# Patient Record
Sex: FEMALE | Race: BLACK OR AFRICAN AMERICAN | NOT HISPANIC OR LATINO | URBAN - METROPOLITAN AREA
[De-identification: names, ages, dates, MRNs, and addresses within clinical notes are randomized per-mention and may not be internally consistent; named-entity substitution may affect disease eponyms.]

---

## 2021-05-11 ENCOUNTER — OFFICE VISIT (OUTPATIENT)
Dept: URBAN - METROPOLITAN AREA CLINIC 92 | Facility: CLINIC | Age: 76
End: 2021-05-11
Payer: COMMERCIAL

## 2021-05-11 DIAGNOSIS — Z86.010 PERSONAL HISTORY OF COLONIC POLYPS: ICD-10-CM

## 2021-05-11 PROCEDURE — 99202 OFFICE O/P NEW SF 15 MIN: CPT | Performed by: INTERNAL MEDICINE

## 2021-05-11 RX ORDER — KETOTIFEN FUMARATE 0.35 MG/ML
SOLUTION OPHTHALMIC
Qty: 0 | Refills: 0 | Status: ON HOLD | COMMUNITY
Start: 1900-01-01

## 2021-05-11 RX ORDER — LEVOBUNOLOL HYDROCHLORIDE 5 MG/ML
INSTILL 1 DROP INTO AFFECTED EYE(S) BY OPHTHALMIC ROUTE ONCE DAILY SOLUTION/ DROPS OPHTHALMIC 1
Qty: 1 | Refills: 0 | Status: ACTIVE | COMMUNITY
Start: 1900-01-01

## 2021-05-11 RX ORDER — LATANOPROST 0.005 %
INSTILL 1 DROP INTO AFFECTED EYE(S) BY OPHTHALMIC ROUTE ONCE DAILY IN THE EVENING DROPS OPHTHALMIC (EYE) 1
Qty: 1 | Refills: 0 | Status: ACTIVE | COMMUNITY
Start: 1900-01-01

## 2021-05-11 RX ORDER — TAMOXIFEN CITRATE 20 MG/1
TAKE 1 TABLET (20 MG) BY ORAL ROUTE ONCE DAILY TABLET, FILM COATED ORAL 1
Qty: 0 | Refills: 0 | Status: ON HOLD | COMMUNITY
Start: 1900-01-01

## 2021-05-11 RX ORDER — LISINOPRIL 10 MG/1
TABLET ORAL
Qty: 0 | Refills: 0 | Status: ON HOLD | COMMUNITY
Start: 1900-01-01

## 2021-05-11 NOTE — HPI-TODAY'S VISIT:
Pt seen for surveillance colonoscopy. 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 Last colonoscopy with Dr. Baum in 2016 normal. Repeat rec'd in 5 yrs. Denies diarrhea, constipation, wt loss or GI bleeding.

## 2021-05-26 PROBLEM — 428283002: Status: ACTIVE | Noted: 2021-05-11

## 2021-06-25 ENCOUNTER — OFFICE VISIT (OUTPATIENT)
Dept: URBAN - METROPOLITAN AREA SURGERY CENTER 16 | Facility: SURGERY CENTER | Age: 76
End: 2021-06-25
Payer: COMMERCIAL

## 2021-06-25 DIAGNOSIS — Z86.010 H/O ADENOMATOUS POLYP OF COLON: ICD-10-CM

## 2021-06-25 PROCEDURE — G0105 COLORECTAL SCRN; HI RISK IND: HCPCS | Performed by: INTERNAL MEDICINE

## 2021-06-25 PROCEDURE — G8907 PT DOC NO EVENTS ON DISCHARG: HCPCS | Performed by: INTERNAL MEDICINE

## 2024-04-17 ENCOUNTER — OV EP (OUTPATIENT)
Dept: URBAN - METROPOLITAN AREA CLINIC 105 | Facility: CLINIC | Age: 79
End: 2024-04-17

## 2024-05-20 ENCOUNTER — DASHBOARD ENCOUNTERS (OUTPATIENT)
Age: 79
End: 2024-05-20

## 2024-05-20 ENCOUNTER — LAB OUTSIDE AN ENCOUNTER (OUTPATIENT)
Dept: URBAN - METROPOLITAN AREA CLINIC 17 | Facility: CLINIC | Age: 79
End: 2024-05-20

## 2024-05-20 ENCOUNTER — OFFICE VISIT (OUTPATIENT)
Dept: URBAN - METROPOLITAN AREA CLINIC 17 | Facility: CLINIC | Age: 79
End: 2024-05-20
Payer: MEDICARE

## 2024-05-20 VITALS
HEART RATE: 67 BPM | DIASTOLIC BLOOD PRESSURE: 70 MMHG | TEMPERATURE: 98 F | SYSTOLIC BLOOD PRESSURE: 156 MMHG | WEIGHT: 160.4 LBS | HEIGHT: 67 IN | BODY MASS INDEX: 25.18 KG/M2

## 2024-05-20 DIAGNOSIS — Z86.010 PERSONAL HISTORY OF COLONIC POLYPS: ICD-10-CM

## 2024-05-20 DIAGNOSIS — R74.8 ELEVATED LIVER ENZYMES: ICD-10-CM

## 2024-05-20 DIAGNOSIS — R49.0 HOARSENESS: ICD-10-CM

## 2024-05-20 DIAGNOSIS — E80.6 INDIRECT HYPERBILIRUBINEMIA: ICD-10-CM

## 2024-05-20 DIAGNOSIS — K59.09 CHRONIC CONSTIPATION: ICD-10-CM

## 2024-05-20 PROBLEM — 7752002: Status: ACTIVE | Noted: 2024-05-20

## 2024-05-20 PROCEDURE — 99204 OFFICE O/P NEW MOD 45 MIN: CPT | Performed by: INTERNAL MEDICINE

## 2024-05-20 RX ORDER — ATORVASTATIN CALCIUM 40 MG/1
TABLET ORAL
Qty: 90 EACH | Refills: 0 | Status: ACTIVE | COMMUNITY

## 2024-05-20 RX ORDER — KETOTIFEN FUMARATE 0.35 MG/ML
SOLUTION OPHTHALMIC
Qty: 0 | Refills: 0 | Status: ON HOLD | COMMUNITY
Start: 1900-01-01

## 2024-05-20 RX ORDER — NICOTINE 14MG/24HR
AS DIRECTED PATCH, TRANSDERMAL 24 HOURS TRANSDERMAL
Status: ACTIVE | COMMUNITY

## 2024-05-20 RX ORDER — BRIMONIDINE TARTRATE 1 MG/ML
1 DROP INTO AFFECTED EYE SOLUTION/ DROPS OPHTHALMIC
Status: ACTIVE | COMMUNITY

## 2024-05-20 RX ORDER — AMLODIPINE BESYLATE 10 MG/1
TABLET ORAL
Qty: 90 TABLET | Status: ACTIVE | COMMUNITY

## 2024-05-20 RX ORDER — LATANOPROST 50 UG/ML
INSTILL 1 DROP IN BOTH EYES AT BEDTIME SOLUTION OPHTHALMIC
Qty: 10 MILLILITER | Refills: 0 | Status: ACTIVE | COMMUNITY

## 2024-05-20 RX ORDER — TAMOXIFEN CITRATE 20 MG/1
TAKE 1 TABLET (20 MG) BY ORAL ROUTE ONCE DAILY TABLET, FILM COATED ORAL 1
Qty: 0 | Refills: 0 | Status: ON HOLD | COMMUNITY
Start: 1900-01-01

## 2024-05-20 RX ORDER — TIMOLOL MALEATE 5 MG/ML
SOLUTION OPHTHALMIC
Qty: 15 MILLILITER | Status: ACTIVE | COMMUNITY

## 2024-05-20 RX ORDER — METOPROLOL SUCCINATE 25 MG/1
TABLET, FILM COATED, EXTENDED RELEASE ORAL
Qty: 45 EACH | Refills: 1 | Status: ACTIVE | COMMUNITY

## 2024-05-20 RX ORDER — LISINOPRIL 10 MG/1
TABLET ORAL
Qty: 0 | Refills: 0 | Status: ON HOLD | COMMUNITY
Start: 1900-01-01

## 2024-05-20 RX ORDER — VIT A/VIT C/VIT E/ZINC/COPPER 2148-113
AS DIRECTED TABLET ORAL
Status: ACTIVE | COMMUNITY

## 2024-05-21 LAB
ALBUMIN/GLOBULIN RATIO: 1.4
ALBUMIN: 4.9
ALKALINE PHOSPHATASE: 112
ALT (SGPT): 13
AST (SGOT): 15
BILIRUBIN, DIRECT: 0.3
BILIRUBIN, INDIRECT: 1.3
BILIRUBIN, TOTAL: 1.6
GLOBULIN: 3.4
HBSAG SCREEN: (no result)
HEP A AB, IGM: (no result)
HEP B CORE AB, IGM: (no result)
HEPATITIS C ANTIBODY: (no result)
PROTEIN, TOTAL: 8.3

## 2024-06-06 ENCOUNTER — OFFICE VISIT (OUTPATIENT)
Dept: URBAN - METROPOLITAN AREA CLINIC 16 | Facility: CLINIC | Age: 79
End: 2024-06-06
Payer: COMMERCIAL

## 2024-06-06 DIAGNOSIS — K76.89 LESION OF LIVER: ICD-10-CM

## 2024-06-06 DIAGNOSIS — N28.1 RENAL CYST, RIGHT: ICD-10-CM

## 2024-06-06 DIAGNOSIS — R74.8 ABNORMAL LIVER ENZYMES: ICD-10-CM

## 2024-06-06 PROCEDURE — 76705 ECHO EXAM OF ABDOMEN: CPT | Performed by: INTERNAL MEDICINE

## 2024-06-06 RX ORDER — LISINOPRIL 10 MG/1
TABLET ORAL
Qty: 0 | Refills: 0 | Status: ON HOLD | COMMUNITY
Start: 1900-01-01

## 2024-06-06 RX ORDER — AMLODIPINE BESYLATE 10 MG/1
TABLET ORAL
Qty: 90 TABLET | Status: ACTIVE | COMMUNITY

## 2024-06-06 RX ORDER — KETOTIFEN FUMARATE 0.35 MG/ML
SOLUTION OPHTHALMIC
Qty: 0 | Refills: 0 | Status: ON HOLD | COMMUNITY
Start: 1900-01-01

## 2024-06-06 RX ORDER — METOPROLOL SUCCINATE 25 MG/1
TABLET, FILM COATED, EXTENDED RELEASE ORAL
Qty: 45 EACH | Refills: 1 | Status: ACTIVE | COMMUNITY

## 2024-06-06 RX ORDER — TIMOLOL MALEATE 5 MG/ML
SOLUTION OPHTHALMIC
Qty: 15 MILLILITER | Status: ACTIVE | COMMUNITY

## 2024-06-06 RX ORDER — NICOTINE 14MG/24HR
AS DIRECTED PATCH, TRANSDERMAL 24 HOURS TRANSDERMAL
Status: ACTIVE | COMMUNITY

## 2024-06-06 RX ORDER — BRIMONIDINE TARTRATE 1 MG/ML
1 DROP INTO AFFECTED EYE SOLUTION/ DROPS OPHTHALMIC
Status: ACTIVE | COMMUNITY

## 2024-06-06 RX ORDER — TAMOXIFEN CITRATE 20 MG/1
TAKE 1 TABLET (20 MG) BY ORAL ROUTE ONCE DAILY TABLET, FILM COATED ORAL 1
Qty: 0 | Refills: 0 | Status: ON HOLD | COMMUNITY
Start: 1900-01-01

## 2024-06-06 RX ORDER — VIT A/VIT C/VIT E/ZINC/COPPER 2148-113
AS DIRECTED TABLET ORAL
Status: ACTIVE | COMMUNITY

## 2024-06-06 RX ORDER — LATANOPROST 50 UG/ML
INSTILL 1 DROP IN BOTH EYES AT BEDTIME SOLUTION OPHTHALMIC
Qty: 10 MILLILITER | Refills: 0 | Status: ACTIVE | COMMUNITY

## 2024-06-06 RX ORDER — ATORVASTATIN CALCIUM 40 MG/1
TABLET ORAL
Qty: 90 EACH | Refills: 0 | Status: ACTIVE | COMMUNITY

## 2024-06-28 ENCOUNTER — OFFICE VISIT (OUTPATIENT)
Dept: URBAN - METROPOLITAN AREA CLINIC 105 | Facility: CLINIC | Age: 79
End: 2024-06-28
Payer: COMMERCIAL

## 2024-06-28 ENCOUNTER — LAB OUTSIDE AN ENCOUNTER (OUTPATIENT)
Dept: URBAN - METROPOLITAN AREA CLINIC 105 | Facility: CLINIC | Age: 79
End: 2024-06-28

## 2024-06-28 VITALS
HEIGHT: 67 IN | HEART RATE: 63 BPM | TEMPERATURE: 97.2 F | DIASTOLIC BLOOD PRESSURE: 78 MMHG | BODY MASS INDEX: 24.01 KG/M2 | WEIGHT: 153 LBS | SYSTOLIC BLOOD PRESSURE: 179 MMHG

## 2024-06-28 DIAGNOSIS — K59.09 CHRONIC CONSTIPATION: ICD-10-CM

## 2024-06-28 DIAGNOSIS — R49.0 HOARSENESS: ICD-10-CM

## 2024-06-28 DIAGNOSIS — E80.6 INDIRECT HYPERBILIRUBINEMIA: ICD-10-CM

## 2024-06-28 DIAGNOSIS — Z86.010 PERSONAL HISTORY OF COLONIC POLYPS: ICD-10-CM

## 2024-06-28 DIAGNOSIS — K76.9 LIVER LESION: ICD-10-CM

## 2024-06-28 DIAGNOSIS — R74.8 ELEVATED LIVER ENZYMES: ICD-10-CM

## 2024-06-28 PROBLEM — 300331000: Status: ACTIVE | Noted: 2024-06-28

## 2024-06-28 PROCEDURE — 99214 OFFICE O/P EST MOD 30 MIN: CPT | Performed by: INTERNAL MEDICINE

## 2024-06-28 RX ORDER — TIMOLOL MALEATE 5 MG/ML
SOLUTION OPHTHALMIC
Qty: 15 MILLILITER | Status: ACTIVE | COMMUNITY

## 2024-06-28 RX ORDER — METOPROLOL SUCCINATE 25 MG/1
TABLET, FILM COATED, EXTENDED RELEASE ORAL
Qty: 45 EACH | Refills: 1 | Status: ACTIVE | COMMUNITY

## 2024-06-28 RX ORDER — BRIMONIDINE TARTRATE 1 MG/ML
1 DROP INTO AFFECTED EYE SOLUTION/ DROPS OPHTHALMIC
Status: ACTIVE | COMMUNITY

## 2024-06-28 RX ORDER — AMLODIPINE BESYLATE 10 MG/1
TABLET ORAL
Qty: 90 TABLET | Status: ACTIVE | COMMUNITY

## 2024-06-28 RX ORDER — NICOTINE 14MG/24HR
AS DIRECTED PATCH, TRANSDERMAL 24 HOURS TRANSDERMAL
Status: ACTIVE | COMMUNITY

## 2024-06-28 RX ORDER — VIT A/VIT C/VIT E/ZINC/COPPER 2148-113
AS DIRECTED TABLET ORAL
Status: ACTIVE | COMMUNITY

## 2024-06-28 RX ORDER — LATANOPROST 50 UG/ML
INSTILL 1 DROP IN BOTH EYES AT BEDTIME SOLUTION OPHTHALMIC
Qty: 10 MILLILITER | Refills: 0 | Status: ACTIVE | COMMUNITY

## 2024-06-28 NOTE — HPI-TODAY'S VISIT:
Tom Fernández 2021 Pt seen for surveillance colonoscopy. 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 Last colonoscopy with Dr. Baum in 2016 normal. Repeat rec'd in 5 yrs. Denies diarrhea, constipation, wt loss or GI bleeding.  5/20/24 78 yo lady pt of [facility] REfered for elevated liver enzymes. The blood work she brings only shows TB 1.3 with indirect 1.04.  She has never had liver disease and has not been previously told she had elevated liver enzymes NO fhx of liver disease and does not drink etoh.  NO abd pain.  Has regular BMs.  6/28/24 Discussed labs US US showing small likely benign liver lesions - ? granuloma vs hemangioma. 3.2 cm renal renal cyst.  Pt has a urologist who follows her renal cyst "they check me every 6 months' Answered questions.

## 2024-07-29 ENCOUNTER — OFFICE VISIT (OUTPATIENT)
Dept: URBAN - METROPOLITAN AREA CLINIC 105 | Facility: CLINIC | Age: 79
End: 2024-07-29

## 2024-07-29 RX ORDER — LATANOPROST 50 UG/ML
INSTILL 1 DROP IN BOTH EYES AT BEDTIME SOLUTION OPHTHALMIC
Qty: 10 MILLILITER | Refills: 0 | Status: ACTIVE | COMMUNITY

## 2024-07-29 RX ORDER — NICOTINE 14MG/24HR
AS DIRECTED PATCH, TRANSDERMAL 24 HOURS TRANSDERMAL
Status: ACTIVE | COMMUNITY

## 2024-07-29 RX ORDER — TIMOLOL MALEATE 5 MG/ML
SOLUTION OPHTHALMIC
Qty: 15 MILLILITER | Status: ACTIVE | COMMUNITY

## 2024-07-29 RX ORDER — BRIMONIDINE TARTRATE 1 MG/ML
1 DROP INTO AFFECTED EYE SOLUTION/ DROPS OPHTHALMIC
Status: ACTIVE | COMMUNITY

## 2024-07-29 RX ORDER — VIT A/VIT C/VIT E/ZINC/COPPER 2148-113
AS DIRECTED TABLET ORAL
Status: ACTIVE | COMMUNITY

## 2024-07-29 RX ORDER — AMLODIPINE BESYLATE 10 MG/1
TABLET ORAL
Qty: 90 TABLET | Status: ACTIVE | COMMUNITY

## 2024-07-29 RX ORDER — METOPROLOL SUCCINATE 25 MG/1
TABLET, FILM COATED, EXTENDED RELEASE ORAL
Qty: 45 EACH | Refills: 1 | Status: ACTIVE | COMMUNITY

## 2024-08-22 ENCOUNTER — OFFICE VISIT (OUTPATIENT)
Dept: URBAN - METROPOLITAN AREA CLINIC 105 | Facility: CLINIC | Age: 79
End: 2024-08-22
Payer: COMMERCIAL

## 2024-08-22 VITALS
TEMPERATURE: 96.5 F | HEIGHT: 67 IN | DIASTOLIC BLOOD PRESSURE: 77 MMHG | WEIGHT: 150 LBS | HEART RATE: 67 BPM | SYSTOLIC BLOOD PRESSURE: 185 MMHG | BODY MASS INDEX: 23.54 KG/M2

## 2024-08-22 DIAGNOSIS — K59.09 CHRONIC CONSTIPATION: ICD-10-CM

## 2024-08-22 DIAGNOSIS — K76.89 LIVER LESION: ICD-10-CM

## 2024-08-22 DIAGNOSIS — R74.8 ELEVATED LIVER ENZYMES: ICD-10-CM

## 2024-08-22 PROCEDURE — 99213 OFFICE O/P EST LOW 20 MIN: CPT | Performed by: INTERNAL MEDICINE

## 2024-08-22 RX ORDER — BRIMONIDINE TARTRATE 1 MG/ML
1 DROP INTO AFFECTED EYE SOLUTION/ DROPS OPHTHALMIC
Status: ON HOLD | COMMUNITY

## 2024-08-22 RX ORDER — NICOTINE 14MG/24HR
AS DIRECTED PATCH, TRANSDERMAL 24 HOURS TRANSDERMAL
Status: ACTIVE | COMMUNITY

## 2024-08-22 RX ORDER — TIMOLOL MALEATE 5 MG/ML
SOLUTION OPHTHALMIC
Qty: 15 MILLILITER | Status: ACTIVE | COMMUNITY

## 2024-08-22 RX ORDER — LATANOPROST 50 UG/ML
INSTILL 1 DROP IN BOTH EYES AT BEDTIME SOLUTION OPHTHALMIC
Qty: 10 MILLILITER | Refills: 0 | Status: ACTIVE | COMMUNITY

## 2024-08-22 RX ORDER — AMLODIPINE BESYLATE 10 MG/1
TABLET ORAL
Qty: 90 TABLET | Status: ACTIVE | COMMUNITY

## 2024-08-22 RX ORDER — METOPROLOL SUCCINATE 25 MG/1
TABLET, FILM COATED, EXTENDED RELEASE ORAL
Qty: 45 EACH | Refills: 1 | Status: ACTIVE | COMMUNITY

## 2024-08-22 RX ORDER — VIT A/VIT C/VIT E/ZINC/COPPER 2148-113
AS DIRECTED TABLET ORAL
Status: ACTIVE | COMMUNITY

## 2024-08-22 NOTE — HPI-TODAY'S VISIT:
Tom Fernández 2021 Pt seen for surveillance colonoscopy. 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 Last colonoscopy with Dr. Baum in 2016 normal. Repeat rec'd in 5 yrs. Denies diarrhea, constipation, wt loss or GI bleeding.  5/20/24 80 yo lady pt of [facility] REfered for elevated liver enzymes. The blood work she brings only shows TB 1.3 with indirect 1.04.  She has never had liver disease and has not been previously told she had elevated liver enzymes NO fhx of liver disease and does not drink etoh.  NO abd pain.  Has regular BMs.  6/28/24 Discussed labs US US showing small likely benign liver lesions - ? granuloma vs hemangioma. 3.2 cm renal renal cyst.  Pt has a urologist who follows her renal cyst "they check me every 6 months' Answered questions.  8/22/24 Pt presents for f/u. MRI abdomen with and without contrast 8/12/24: Subcentimeter hepatic cysts, bilateral renal cysts, incidental duplicated IVC, mild degenerative changes of thoracolumbar spine. She has had 2 hepatitis B vaccine doses and will be getting the 3rd soon. She has no issues or symptoms of concern to discuss today. Last colonoscopy was 2021, due in 2026 with Dr. Tom Fernández. Her BP is elevated in office, but was in 120s/80s at home this morning.